# Patient Record
Sex: MALE | Race: WHITE | NOT HISPANIC OR LATINO | Employment: OTHER | URBAN - METROPOLITAN AREA
[De-identification: names, ages, dates, MRNs, and addresses within clinical notes are randomized per-mention and may not be internally consistent; named-entity substitution may affect disease eponyms.]

---

## 2018-05-21 ENCOUNTER — LAB VISIT (OUTPATIENT)
Dept: LAB | Facility: HOSPITAL | Age: 58
End: 2018-05-21
Attending: INTERNAL MEDICINE
Payer: COMMERCIAL

## 2018-05-21 DIAGNOSIS — I25.10 DISEASE OF CARDIOVASCULAR SYSTEM: ICD-10-CM

## 2018-05-21 DIAGNOSIS — E78.5 HYPERLIPIDEMIA: Primary | ICD-10-CM

## 2018-05-21 LAB
ALBUMIN SERPL BCP-MCNC: 4.1 G/DL
ALP SERPL-CCNC: 48 U/L
ALT SERPL W/O P-5'-P-CCNC: 30 U/L
ANION GAP SERPL CALC-SCNC: 6 MMOL/L
AST SERPL-CCNC: 25 U/L
BILIRUB SERPL-MCNC: 0.5 MG/DL
BUN SERPL-MCNC: 13 MG/DL
CALCIUM SERPL-MCNC: 9.1 MG/DL
CHLORIDE SERPL-SCNC: 105 MMOL/L
CHOLEST SERPL-MCNC: 108 MG/DL
CHOLEST/HDLC SERPL: 2.6 {RATIO}
CO2 SERPL-SCNC: 26 MMOL/L
CREAT SERPL-MCNC: 1 MG/DL
ERYTHROCYTE [DISTWIDTH] IN BLOOD BY AUTOMATED COUNT: 13 %
EST. GFR  (AFRICAN AMERICAN): >60 ML/MIN/1.73 M^2
EST. GFR  (NON AFRICAN AMERICAN): >60 ML/MIN/1.73 M^2
ESTIMATED AVG GLUCOSE: 111 MG/DL
GLUCOSE SERPL-MCNC: 115 MG/DL
HBA1C MFR BLD HPLC: 5.5 %
HCT VFR BLD AUTO: 45.4 %
HDLC SERPL-MCNC: 41 MG/DL
HDLC SERPL: 38 %
HGB BLD-MCNC: 15.2 G/DL
LDLC SERPL CALC-MCNC: 58 MG/DL
MCH RBC QN AUTO: 29.6 PG
MCHC RBC AUTO-ENTMCNC: 33.5 G/DL
MCV RBC AUTO: 89 FL
NONHDLC SERPL-MCNC: 67 MG/DL
PLATELET # BLD AUTO: 232 K/UL
PMV BLD AUTO: 9.4 FL
POTASSIUM SERPL-SCNC: 4.4 MMOL/L
PROT SERPL-MCNC: 7 G/DL
RBC # BLD AUTO: 5.13 M/UL
SODIUM SERPL-SCNC: 137 MMOL/L
TRIGL SERPL-MCNC: 45 MG/DL
WBC # BLD AUTO: 7.25 K/UL

## 2018-05-21 PROCEDURE — 85027 COMPLETE CBC AUTOMATED: CPT

## 2018-05-21 PROCEDURE — 36415 COLL VENOUS BLD VENIPUNCTURE: CPT

## 2018-05-21 PROCEDURE — 83036 HEMOGLOBIN GLYCOSYLATED A1C: CPT

## 2018-05-21 PROCEDURE — 80053 COMPREHEN METABOLIC PANEL: CPT

## 2018-05-21 PROCEDURE — 80061 LIPID PANEL: CPT

## 2020-09-21 ENCOUNTER — HOSPITAL ENCOUNTER (EMERGENCY)
Facility: HOSPITAL | Age: 60
Discharge: HOME | End: 2020-09-21
Attending: EMERGENCY MEDICINE
Payer: COMMERCIAL

## 2020-09-21 VITALS
SYSTOLIC BLOOD PRESSURE: 158 MMHG | BODY MASS INDEX: 27.17 KG/M2 | OXYGEN SATURATION: 97 % | RESPIRATION RATE: 20 BRPM | HEART RATE: 78 BPM | TEMPERATURE: 98 F | HEIGHT: 73 IN | WEIGHT: 205 LBS | DIASTOLIC BLOOD PRESSURE: 97 MMHG

## 2020-09-21 DIAGNOSIS — L03.115 CELLULITIS OF RIGHT LOWER EXTREMITY: Primary | ICD-10-CM

## 2020-09-21 DIAGNOSIS — M79.604 ACUTE PAIN OF RIGHT LOWER EXTREMITY: ICD-10-CM

## 2020-09-21 PROCEDURE — 99284 EMERGENCY DEPT VISIT MOD MDM: CPT

## 2020-09-21 RX ORDER — TRAMADOL HYDROCHLORIDE 50 MG/1
50 TABLET ORAL EVERY 6 HOURS PRN
Qty: 16 TABLET | Refills: 0 | Status: SHIPPED | OUTPATIENT
Start: 2020-09-21 | End: 2023-09-03

## 2020-09-21 RX ORDER — SULFAMETHOXAZOLE AND TRIMETHOPRIM 800; 160 MG/1; MG/1
1 TABLET ORAL
COMMUNITY
End: 2023-09-03

## 2020-09-21 RX ORDER — CEPHALEXIN 500 MG/1
500 CAPSULE ORAL EVERY 8 HOURS
Qty: 21 CAPSULE | Refills: 0 | Status: SHIPPED | OUTPATIENT
Start: 2020-09-21 | End: 2020-09-28

## 2020-09-21 NOTE — DISCHARGE INSTRUCTIONS
Complete all antibiotics as prescribed.  Take OTC probiotic daily or eat 1 small cup of yogurt daily while on antibiotics.  Drink fluids, eat diet as tolerated & rest.  Take all medications as prescribed.      Download the [employer] estella to access your health records & test results.  Please remember that you had a visit to the emergency room today and this does not substitute as primary care services for ongoing management because emergency services is a snap shot in time.  Should you have any worsening condition that requires emergency services do not hesitate to return to the ER.    COVID-19 TESTING  Hot Line [phone]  [address] [place] [address], [place] [id number]  [facility]  Hours: 8am-5pm Monday - Friday   8am-noon Saturday - Sunday

## 2020-09-21 NOTE — ED PROVIDER NOTES
Encounter Date: 9/21/2020       History     Chief Complaint   Patient presents with    Cellulitis     Patient has cellulitis to his right leg, seen at Urgent care on Friday and given Bactrim.     59-year-old male presents to ER with concerns of right lower extremity pain and cellulitis; patient went to a local urgent care this past Friday where he was prescribed Bactrim, he states that the cellulitis has not proved but also has not worsened, pain worsens with palpation, denies any significant alleviating factors, is not on any prescription OTC pain medication -- patient has not followed up with his PCP citing difficulty of getting an appointment    Denies:  fever, injury or previous surgery to RLE, being diabetic    Past medical/surgical history, allergies & current medications reviewed with patient    Known SARS-CoV2 exposure:  No  Room:  PIT    The history is provided by the patient. No  was used.     Review of patient's allergies indicates:  No Known Allergies  Past Medical History:   Diagnosis Date    Cancer     Prostate    Hyperlipidemia     Hypertension      Past Surgical History:   Procedure Laterality Date    PROSTATE SURGERY       History reviewed. No pertinent family history.  Social History     Tobacco Use    Smoking status: Never Smoker   Substance Use Topics    Alcohol use: Yes     Comment: occ    Drug use: Never     Review of Systems   Constitutional: Negative for fever.   HENT: Negative for sore throat.    Respiratory: Negative for shortness of breath.    Cardiovascular: Negative for chest pain.   Gastrointestinal: Negative for nausea.   Genitourinary: Negative for dysuria.   Musculoskeletal: Positive for myalgias. Negative for back pain and gait problem.   Skin: Positive for color change. Negative for rash and wound.   Neurological: Negative for weakness.   Hematological: Does not bruise/bleed easily.   All other systems reviewed and are negative.      Physical Exam      Initial Vitals [09/21/20 1518]   BP Pulse Resp Temp SpO2   (!) 158/97 78 20 98.1 °F (36.7 °C) 97 %      MAP       --         Physical Exam    Nursing note and vitals reviewed.  Constitutional: He appears well-developed. He does not appear ill. No distress.   Afebrile, vital signs stable   HENT:   Head: Normocephalic and atraumatic.   Right Ear: External ear normal.   Left Ear: External ear normal.   Nose: Nose normal.   Eyes: Lids are normal.   Neck: Neck supple.   Cardiovascular: Normal rate.   Pulses:       Dorsalis pedis pulses are 2+ on the right side and 2+ on the left side.        Posterior tibial pulses are 2+ on the right side and 2+ on the left side.   Pulmonary/Chest: Effort normal. No respiratory distress.   Abdominal: He exhibits no distension.   Musculoskeletal:      Right lower leg: He exhibits tenderness. He exhibits no bony tenderness, no swelling, no deformity and no laceration. Edema present.        Legs:    Neurological: He is alert.   Skin: Skin is warm and intact. No rash noted. There is erythema.   Psychiatric: He has a normal mood and affect.         ED Course   Procedures  Labs Reviewed - No data to display       Imaging Results    None          Medical Decision Making:   ED Management:  Findings, diagnosis & plan of care discussed with patient:  RLE pain/cellulitis; given that patient is currently on Bactrim we will add Keflex for g negative coverage, we will also provide patient Ultram for pain management, care instructions given -- due to patient difficulty being seen by his PCP we will refer him to [facility] for close follow-up    All questions answered, strict return precautions given, patient verbalized understanding to all instructions, pleasant visit -- vital signs are stable & patient is in no distress at discharge    MS/LA  reviewed x1 year -- last:  03/18/2020  OXYCODONE-ACETAMINOPHEN 5-325  40.0   We discussed the pros, cons and risks of opiate use, patient  verbally acknowledged the risks of opioid/controlled substance use, patient instructed to read the information given with the medication by the pharmacy prior to taking the 1st dose.    Disclaimer:  This note was prepared with [employer] Naturally [employer] voice recognition transcription software. Garbled syntax, mangled pronouns, and other bizarre constructions may be attributed to that software system.  Should there be any questions do not hesitate to contact me for clarification.                               Clinical Impression:       ICD-10-CM ICD-9-CM   1. Cellulitis of right lower extremity  L03.115 682.6   2. Acute pain of right lower extremity  M79.604 729.5                          ED Disposition Condition    Discharge Stable        ED Prescriptions     Medication Sig Dispense Start Date End Date Auth. Provider    cephALEXin (KEFLEX) 500 MG capsule Take 1 capsule (500 mg total) by mouth every 8 (eight) hours. for 7 days 21 capsule 9/21/2020 9/28/2020 Antonio Blanchard NP    traMADoL (ULTRAM) 50 mg tablet Take 1 tablet (50 mg total) by mouth every 6 (six) hours as needed for Pain. 16 tablet 9/21/2020  Antonio Blanchard NP        Follow-up Information     Follow up With Specialties Details Why Contact Info    [facility]  Go to  You should be contacted within the next few days with appointment date/time                                        Antonio Blanchard NP  09/21/20 7622

## 2020-09-30 PROBLEM — U07.1 COVID-19 VIRUS INFECTION: Status: ACTIVE | Noted: 2020-09-30

## 2021-05-06 RX ORDER — LOSARTAN POTASSIUM 25 MG/1
25 TABLET ORAL DAILY
Qty: 30 TABLET | Refills: 3 | Status: SHIPPED | OUTPATIENT
Start: 2021-05-06

## 2021-05-06 RX ORDER — METOPROLOL SUCCINATE 25 MG/1
12.5 TABLET, EXTENDED RELEASE ORAL DAILY
Qty: 15 TABLET | Refills: 3 | Status: SHIPPED | OUTPATIENT
Start: 2021-05-06

## 2021-05-06 RX ORDER — ATORVASTATIN CALCIUM 80 MG/1
80 TABLET, FILM COATED ORAL NIGHTLY
Qty: 30 TABLET | Refills: 3 | Status: SHIPPED | OUTPATIENT
Start: 2021-05-06

## 2021-08-17 ENCOUNTER — TELEPHONE (OUTPATIENT)
Dept: CARDIOLOGY | Facility: CLINIC | Age: 61
End: 2021-08-17

## 2023-09-03 ENCOUNTER — OFFICE VISIT (OUTPATIENT)
Dept: URGENT CARE | Facility: CLINIC | Age: 63
End: 2023-09-03
Payer: COMMERCIAL

## 2023-09-03 VITALS
HEART RATE: 76 BPM | DIASTOLIC BLOOD PRESSURE: 70 MMHG | HEIGHT: 73 IN | TEMPERATURE: 98 F | WEIGHT: 210 LBS | BODY MASS INDEX: 27.83 KG/M2 | OXYGEN SATURATION: 96 % | SYSTOLIC BLOOD PRESSURE: 130 MMHG

## 2023-09-03 DIAGNOSIS — L03.90 CELLULITIS, UNSPECIFIED CELLULITIS SITE: Primary | ICD-10-CM

## 2023-09-03 PROCEDURE — 99203 PR OFFICE/OUTPT VISIT, NEW, LEVL III, 30-44 MIN: ICD-10-PCS | Mod: S$GLB,,, | Performed by: NURSE PRACTITIONER

## 2023-09-03 PROCEDURE — 99203 OFFICE O/P NEW LOW 30 MIN: CPT | Mod: S$GLB,,, | Performed by: NURSE PRACTITIONER

## 2023-09-03 RX ORDER — DOXYCYCLINE 100 MG/1
100 CAPSULE ORAL EVERY 12 HOURS
Qty: 14 CAPSULE | Refills: 0 | Status: SHIPPED | OUTPATIENT
Start: 2023-09-03 | End: 2023-09-10

## 2023-09-03 RX ORDER — PREDNISONE 20 MG/1
20 TABLET ORAL DAILY
Qty: 4 TABLET | Refills: 0 | Status: SHIPPED | OUTPATIENT
Start: 2023-09-03 | End: 2023-09-07

## 2023-09-03 NOTE — PROGRESS NOTES
"Subjective:       Patient ID: [place] Beck is a 62 y.o. male.    Vitals:  height is 6' 1" (1.854 m) and weight is 95.3 kg (210 lb). His oral temperature is 98.3 °F (36.8 °C). His blood pressure is 130/70 and his pulse is 76. His oxygen saturation is 96%.     Chief Complaint: Insect Bite (Bit on left forearm x 3 days ago. Red and swollen)    This is a 62 y.o. male who presents today with a chief complaint of Bit on left forearm x 3 days ago. Red and swollen and draining.        Insect Bite  This is a new problem. The current episode started in the past 7 days. The problem has been gradually worsening. He has tried nothing for the symptoms. The treatment provided no relief.       Skin:  Positive for erythema.           Objective:      Physical Exam   Constitutional: He is oriented to person, place, and time. normal  HENT:   Head: Normocephalic and atraumatic.   Eyes: Conjunctivae are normal. Extraocular movement intact   Neck: Neck supple.   Cardiovascular: Normal rate, regular rhythm, normal heart sounds and normal pulses.   Pulmonary/Chest: Effort normal and breath sounds normal.   Abdominal: Normal appearance.   Musculoskeletal: Normal range of motion.         General: Swelling present. Normal range of motion.      Comments: Mild erythema/swelling of left forearm.   Neurological: He is alert and oriented to person, place, and time.   Skin: erythema   Psychiatric: His behavior is normal.   Vitals reviewed.        Past medical history and current medications reviewed.       Assessment:           1. Cellulitis, unspecified cellulitis site              Plan:     Return in 24hr for re-evaluation. Patient verbalized understanding of same.    Cellulitis, unspecified cellulitis site  -     doxycycline (VIBRAMYCIN) 100 MG Cap; Take 1 capsule (100 mg total) by mouth every 12 (twelve) hours. for 7 days  Dispense: 14 capsule; Refill: 0  -     predniSONE (DELTASONE) 20 MG tablet; Take 1 tablet (20 mg total) by mouth once " daily. for 4 days  Dispense: 4 tablet; Refill: 0             There are no Patient Instructions on file for this visit.

## 2024-10-09 ENCOUNTER — OFFICE VISIT (OUTPATIENT)
Dept: URGENT CARE | Facility: CLINIC | Age: 64
End: 2024-10-09
Payer: MEDICARE

## 2024-10-09 VITALS
TEMPERATURE: 98 F | WEIGHT: 218 LBS | DIASTOLIC BLOOD PRESSURE: 86 MMHG | OXYGEN SATURATION: 99 % | BODY MASS INDEX: 28.89 KG/M2 | HEIGHT: 73 IN | HEART RATE: 81 BPM | RESPIRATION RATE: 18 BRPM | SYSTOLIC BLOOD PRESSURE: 150 MMHG

## 2024-10-09 DIAGNOSIS — S89.91XA INJURY OF RIGHT LOWER LEG, INITIAL ENCOUNTER: Primary | ICD-10-CM

## 2024-10-09 DIAGNOSIS — S80.811A ABRASION, RIGHT LOWER LEG, INITIAL ENCOUNTER: ICD-10-CM

## 2024-10-09 DIAGNOSIS — L03.116 CELLULITIS OF LEFT LEG: ICD-10-CM

## 2024-10-09 PROCEDURE — 99213 OFFICE O/P EST LOW 20 MIN: CPT | Mod: 25,S$GLB,, | Performed by: NURSE PRACTITIONER

## 2024-10-09 PROCEDURE — 90471 IMMUNIZATION ADMIN: CPT | Mod: S$GLB,,, | Performed by: NURSE PRACTITIONER

## 2024-10-09 PROCEDURE — 90715 TDAP VACCINE 7 YRS/> IM: CPT | Mod: JZ,S$GLB,, | Performed by: NURSE PRACTITIONER

## 2024-10-09 PROCEDURE — 96372 THER/PROPH/DIAG INJ SC/IM: CPT | Mod: 59,S$GLB,, | Performed by: NURSE PRACTITIONER

## 2024-10-09 RX ORDER — DOXYCYCLINE 100 MG/1
100 CAPSULE ORAL 2 TIMES DAILY
Qty: 20 CAPSULE | Refills: 0 | Status: SHIPPED | OUTPATIENT
Start: 2024-10-09 | End: 2024-10-19

## 2024-10-09 RX ORDER — ASPIRIN 81 MG/1
81 TABLET ORAL DAILY
COMMUNITY

## 2024-10-09 RX ORDER — LIDOCAINE HYDROCHLORIDE 10 MG/ML
2 INJECTION, SOLUTION INFILTRATION; PERINEURAL
Status: COMPLETED | OUTPATIENT
Start: 2024-10-09 | End: 2024-10-09

## 2024-10-09 RX ORDER — CEFTRIAXONE 1 G/1
1 INJECTION, POWDER, FOR SOLUTION INTRAMUSCULAR; INTRAVENOUS
Status: COMPLETED | OUTPATIENT
Start: 2024-10-09 | End: 2024-10-09

## 2024-10-09 RX ADMIN — LIDOCAINE HYDROCHLORIDE 2 ML: 10 INJECTION, SOLUTION INFILTRATION; PERINEURAL at 07:10

## 2024-10-09 RX ADMIN — CEFTRIAXONE 1 G: 1 INJECTION, POWDER, FOR SOLUTION INTRAMUSCULAR; INTRAVENOUS at 07:10

## 2024-10-10 NOTE — PATIENT INSTRUCTIONS
Report to ER for any acute worsening of symptoms or concerns.   Keep areas clean and dry.  Avoid scratching areas and perform good hand hygiene.    May alternate Tylenol and Ibuprofen for pain.   Follow-up with PCP or dermatology if no improvement in symptoms or for any worsening of symptoms.

## 2024-10-10 NOTE — PROGRESS NOTES
"Subjective:      Patient ID: [place] Beck is a 63 y.o. male.    Vitals:  height is 6' 1" (1.854 m) and weight is 98.9 kg (218 lb). His oral temperature is 98.1 °F (36.7 °C). His blood pressure is 150/86 (abnormal) and his pulse is 81. His respiration is 18 and oxygen saturation is 99%.     Chief Complaint: Extremity Laceration (Symptoms started on 5 days ago. Symptoms are the following: right leg laceration/shin, shin hit edge of metal tract blunt force, redness, drainage, swelling, tightness, painful to sit. Symptoms treated with the following: peroxide, triple antibiotic ointment, bandage)    This is a 63 y.o. male who presents today with a chief complaint of Extremity Laceration: Symptoms started on 5 days ago. Symptoms are the following: pt reports that he hit his right lower leg on the metal track of an excavator and sustained a small superficial abrasion. He reports no pain at the time. However over the past few days pt is now experiencing redness, drainage, swelling, warmth, and painful. Symptoms treated with the following: peroxide, triple antibiotic ointment, bandage  Patient presents with:         Laceration   The incident occurred 3 to 5 days ago. The laceration is located on the Right leg. The laceration is 1 cm in size. The laceration mechanism was a blunt object and metal edge. The pain is at a severity of 7/10. The pain is moderate. The pain has been Constant since onset. He reports no foreign bodies present. His tetanus status is unknown.       Constitution: Negative.   Skin:  Positive for abrasion, laceration and erythema.      Objective:     Physical Exam   Constitutional: He is oriented to person, place, and time. He appears well-developed. He is cooperative.   HENT:   Head: Normocephalic and atraumatic.   Ears:   Right Ear: Hearing, tympanic membrane, external ear and ear canal normal.   Left Ear: Hearing, tympanic membrane, external ear and ear canal normal.   Nose: Nose normal. No mucosal " edema or nasal deformity. No epistaxis. Right sinus exhibits no maxillary sinus tenderness and no frontal sinus tenderness. Left sinus exhibits no maxillary sinus tenderness and no frontal sinus tenderness.   Mouth/Throat: Uvula is midline, oropharynx is clear and moist and mucous membranes are normal. No trismus in the jaw. Normal dentition. No uvula swelling.   Eyes: Conjunctivae and lids are normal.   Neck: Trachea normal and phonation normal. Neck supple.   Cardiovascular: Normal rate, regular rhythm, normal heart sounds and normal pulses.   Pulmonary/Chest: Effort normal and breath sounds normal.   Abdominal: Normal appearance and bowel sounds are normal. Soft.   Musculoskeletal: Normal range of motion.         General: Normal range of motion.      Right lower leg: He exhibits swelling (superfical linear abrasion noted to mid anterior tib/fib area, 2cm, with mild serous discharge. moderate erythema, swelling, warmth and tenderss noted to RLE, from prosimal tib/fib area to ankle.). He exhibits no tenderness and no laceration.        Legs:    Neurological: He is alert and oriented to person, place, and time. He exhibits normal muscle tone.   Skin: Skin is warm, dry and intact. not right lower legerythema   Psychiatric: His speech is normal and behavior is normal. Judgment and thought content normal.   Nursing note and vitals reviewed.      Assessment:     1. Injury of right lower leg, initial encounter    2. Cellulitis of left leg    3. Abrasion, right lower leg, initial encounter        Plan:       Injury of right lower leg, initial encounter  -     Tdap (BOOSTRIX) vaccine injection 0.5 mL    Cellulitis of left leg  -     cefTRIAXone injection 1 g  -     doxycycline (VIBRAMYCIN) 100 MG Cap; Take 1 capsule (100 mg total) by mouth 2 (two) times a day. for 10 days  Dispense: 20 capsule; Refill: 0  -     LIDOcaine HCL 10 mg/ml (1%) injection 2 mL    Abrasion, right lower leg, initial encounter  -     Tdap (BOOSTRIX)  vaccine injection 0.5 mL          Patient Instructions   Report to ER for any acute worsening of symptoms or concerns.   Keep areas clean and dry.  Avoid scratching areas and perform good hand hygiene.    May alternate Tylenol and Ibuprofen for pain.   Follow-up with PCP or dermatology if no improvement in symptoms or for any worsening of symptoms.           Steve Montaño, KORYP-C